# Patient Record
(demographics unavailable — no encounter records)

---

## 2025-07-29 NOTE — DATA REVIEWED
[FreeTextEntry1] : 1/21/2014 Outside facility arterial doppler normal le perfusion RT RON 1.26 LT RON 1.21  1/21/2014 Outside facility ford le no dvt sig for ford cfv reflux and rt gsv branch w reflux  4/30/14 Left VLE -DVT/SVT. No GSV seen. Deep Insuff. pop veins. Reflux noted in ASV from SFJ to distal thigh  8/19/2014 Venous Duplex LLE sig asv insuff w mild tortuosity  no dvt svt  2/9/2016  venous duplex LLE  no dvt svt bakers cyst 4cm  insuff sfv and pop v   4/5/2016 carotid duplex ford ica less 50% stenosis ford ant va flow  1/16/2018 RON/PVR mild infragenic art occ dz  wvessel calcification  rt ron 1.15 lt ron 1.01   1/18/2018 LLE venous Doppler  lle no acute svt dvt, insuff at knee   1/15/2019 RON/PVR mild infragenic art occ dz  w vessel calcification  rt ron 1.09 lt ron 1.09  1/21/2020 RON/PVR mild infragenic art occ dz  w vessel calcification  rt ron .92 lt ron 1.18   5/18/2021  RON/PVR mild infragenic art occ dz  w vessel calcification  rt ron 1.24  lt ron 1.19  5/18/2021 Carotid Duplex Ford ICA less 50% stenosis Ford ant va flow  7/5/2022 RON/PVR mild infragenic art occ dz  w vessel calcification  rt ron 1.17  lt ron 1.15  7/11/2023 RON/PVR mild infragenic art occ dz  w vessel calcification  rt ron 1.11  lt ron 1.18  7/11/2023 Carotid Duplex Ford ICA less 50% stenosis Ford ant va flow  7/16/2024  Carotid Duplex Ford ICA less 50% stenosis Ford ant va flow  7/16/2024   RON/PVR mild infragenic art occ dz  w vessel calcification                                      rt ron 1.19  lt ron 1.19  7/29/2025  RON/PVR mild infragenic art occ dz  w vessel calcification                                      rt ron 1.24  lt ron 1.20  7/29/2025  Carotid Duplex Ford ICA less 50% stenosis Ford ant va flow

## 2025-07-29 NOTE — ASSESSMENT
[Arterial/Venous Disease] : arterial/venous disease [Medication Management] : medication management [Other: _____] : [unfilled] [Foot care/Footwear] : foot care/footwear [FreeTextEntry1] : Impression arterial and venous insuff stable  and carotid stenosis stable   [facility] management, leg elevation, compression stockings, wt loss, diet control will restart trental if new wounds develop pt wants to hold off  ov  w carotid duplex s/o stenosis  2 years july 2027 ov w  stephanie/pvr s/o art insuff 12 mo july 2026   letter faxed to Dr SYBIL Robles MD     Varicose veins are enlarged, twisted veins. Varicose veins are caused by increased blood pressure in the veins.  The blood moves towards the heart by 1-way valves in the veins. When the valves become weakened or damaged, blood can collect in the veins and pool in your lower legs (ankles). This causes the veins to become enlarged and incompetent with reflux. Sitting or standing for long periods can cause blood to pool in the leg veins, increasing the pressure within the veins.  Risk factors for varicose veins or venous disease may include:  obesity, older age, standing or sitting for prolonged periods of time for several years, being female, pregnancy, taking oral contraceptive pills or hormone replacement, being inactive, and/or smoking.  The most common symptoms of varicose veins are sensations in the legs, such as a heavy feeling, burning, and/or aching. However, each individual may experience symptoms differently.  Other symptoms may include:  color changes in the skin, sores on the legs, or rash.  Severe varicose veins or venous disease may eventually produce long-term mild swelling that can result in more serious skin and tissue problems, such as ulcers and non-healing sores. Varicose veins and venous disease are diagnosed by a complete medical history, physical examination, and diagnostic studies for varicose veins including duplex ultrasound and color-flow imaging.   Medical treatment for varicose veins and venous disease include:  compression stockings, sclerotherapy, endovenous ablation and/or surgical treatment with microphlebectomy.

## 2025-07-29 NOTE — PHYSICAL EXAM
[Right Carotid Bruit] : right carotid bruit heard [Left Carotid Bruit] : left carotid bruit heard [2+] : right 2+ [1+] : left 1+ [Ankle Swelling (On Exam)] : present [Ankle Swelling Bilaterally] : bilaterally  [Varicose Veins Of Lower Extremities] : bilaterally [Ankle Swelling On The Right] : mild [] : bilaterally [Ankle Swelling On The Left] : moderate [Alert] : alert [Oriented to Person] : oriented to person [Oriented to Place] : oriented to place [Oriented to Time] : oriented to time [Calm] : calm [JVD] : no jugular venous distention  [de-identified] : nad [de-identified] : wnl [de-identified] : no resp distress [FreeTextEntry1] : Moderate bilateral leg venous insufficiency  w moderate bilateral leg stasis dermatitis  and mild bilateral leg  edema  Multiple  robson le Varicose  veins and spider veins  ant post thigh and calf and shin  no wounds/ulcers Mild to  mod arterial insuff w moderate trophic skin changes    [de-identified] : wnl [de-identified] : Ford Cranial nerves 2-12 ford grossly intact [de-identified] : cooperative

## 2025-07-29 NOTE — HISTORY OF PRESENT ILLNESS
[FreeTextEntry1] : pt is compliant w comp stockings  \par  pt reports minimal swelling and discomfort w activity since last ov \par  pt reports no new wounds\par  pt has d/c trental tid due to headaches and has not restarted\par  pt denies intermittent claudication or nocturnal leg or foot cramps at this time \par  pt is on xarelto for cardiac ablation  [de-identified] : pt is compliant w comp stockings pt c/o intermittent  leg nocturnal  cramps  pt states it is not affecting  her sleep